# Patient Record
Sex: FEMALE | Race: WHITE | NOT HISPANIC OR LATINO | Employment: FULL TIME | URBAN - METROPOLITAN AREA
[De-identification: names, ages, dates, MRNs, and addresses within clinical notes are randomized per-mention and may not be internally consistent; named-entity substitution may affect disease eponyms.]

---

## 2017-01-11 ENCOUNTER — OFFICE VISIT - RIVER FALLS (OUTPATIENT)
Dept: FAMILY MEDICINE | Facility: CLINIC | Age: 37
End: 2017-01-11

## 2017-01-11 ENCOUNTER — COMMUNICATION - RIVER FALLS (OUTPATIENT)
Dept: FAMILY MEDICINE | Facility: CLINIC | Age: 37
End: 2017-01-11

## 2017-01-11 ASSESSMENT — MIFFLIN-ST. JEOR: SCORE: 1339.95

## 2017-01-14 LAB
CREAT SERPL-MCNC: 0.7 MG/DL (ref 0.5–1.1)
GLUCOSE BLD-MCNC: 91 MG/DL (ref 65–99)

## 2017-01-20 ENCOUNTER — OFFICE VISIT - HEALTHEAST (OUTPATIENT)
Dept: ALLERGY | Facility: CLINIC | Age: 37
End: 2017-01-20

## 2017-01-20 ENCOUNTER — COMMUNICATION - HEALTHEAST (OUTPATIENT)
Dept: TELEHEALTH | Facility: CLINIC | Age: 37
End: 2017-01-20

## 2017-01-20 DIAGNOSIS — L50.9 URTICARIA, UNSPECIFIED: ICD-10-CM

## 2017-08-30 ENCOUNTER — OFFICE VISIT - RIVER FALLS (OUTPATIENT)
Dept: FAMILY MEDICINE | Facility: CLINIC | Age: 37
End: 2017-08-30

## 2017-08-30 ASSESSMENT — MIFFLIN-ST. JEOR: SCORE: 1309.1

## 2017-09-20 ENCOUNTER — OFFICE VISIT - RIVER FALLS (OUTPATIENT)
Dept: FAMILY MEDICINE | Facility: CLINIC | Age: 37
End: 2017-09-20

## 2017-09-20 ASSESSMENT — MIFFLIN-ST. JEOR: SCORE: 1310.92

## 2021-05-13 ENCOUNTER — OFFICE VISIT (OUTPATIENT)
Dept: FAMILY MEDICINE | Facility: CLINIC | Age: 41
End: 2021-05-13
Payer: COMMERCIAL

## 2021-05-13 ENCOUNTER — ANCILLARY PROCEDURE (OUTPATIENT)
Dept: GENERAL RADIOLOGY | Facility: CLINIC | Age: 41
End: 2021-05-13
Attending: NURSE PRACTITIONER
Payer: COMMERCIAL

## 2021-05-13 VITALS
DIASTOLIC BLOOD PRESSURE: 88 MMHG | HEART RATE: 69 BPM | HEIGHT: 62 IN | OXYGEN SATURATION: 98 % | TEMPERATURE: 97.9 F | WEIGHT: 147.4 LBS | RESPIRATION RATE: 17 BRPM | SYSTOLIC BLOOD PRESSURE: 124 MMHG | BODY MASS INDEX: 27.12 KG/M2

## 2021-05-13 DIAGNOSIS — S69.91XA HAND INJURY, RIGHT, INITIAL ENCOUNTER: ICD-10-CM

## 2021-05-13 DIAGNOSIS — S69.91XA HAND INJURY, RIGHT, INITIAL ENCOUNTER: Primary | ICD-10-CM

## 2021-05-13 PROCEDURE — 99203 OFFICE O/P NEW LOW 30 MIN: CPT | Performed by: NURSE PRACTITIONER

## 2021-05-13 PROCEDURE — 73130 X-RAY EXAM OF HAND: CPT | Mod: RT | Performed by: RADIOLOGY

## 2021-05-13 ASSESSMENT — MIFFLIN-ST. JEOR: SCORE: 1296.85

## 2021-05-13 NOTE — PATIENT INSTRUCTIONS
1.  Continue to wear brace.  And use OTC pain medication.  2.  Make appointment with hand specialist in orthopedics for follow-up.

## 2021-05-13 NOTE — PROGRESS NOTES
Assessment & Plan     Hand injury, right, initial encounter  XR appears to show normal placement and no fracture.  There is some space in the metacarpal from wrist but other views show this in good alignment.  Most likely tendon/ligament injury causing symptoms.  Recommend continuing to use brace and follow-up with ortho hand specialist due to reduced movement.  - XR Hand Right G/E 3 Views; Future  - Orthopedic & Spine  Referral; Future    See Patient Instructions    Return for with ortho at your convenience.    Marie Parada NP  [facility]    Liana Salazar is a 41 year old who presents for the following health issues;     HPI     Musculoskeletal problem/pain  Onset/Duration: 4/26/2021  Description  Location: hand/thumb - right  Joint Swelling: YES  Redness: YES  Pain: YES- 8/10  Warmth: YES  Intensity:  moderate  Progression of Symptoms:  worsening and constant  Accompanying signs and symptoms:   Fevers: no  Numbness/tingling/weakness: YES- weakness  History  Trauma to the area: YES- was drinking alcohol and walking downstairs (like 3 Steps) and fell   Recent illness:  no  Previous similar problem: no  Previous evaluation:  YES- went to chiropractor and shoulder was popped back into place but she still cannot move her thumb on her right hand   Precipitating or alleviating factors:  Aggravating factors include: lifting, exercise and overuse  Therapies tried and outcome: heat, ice, support wrap and chiropractor seemed to have helped some     Patient states that when she fell that her thumb was pushed back about 1 inch and she pulled it back up into place.  She did have xray at chiropractic 1 week ago of the right hand and he felt that there was no fracture or dislocation at that time.  Patient is using brace, ice/heat which helps a little but still painful and she is only able to minimally move the thumb.    Review of Systems   CONSTITUTIONAL: NEGATIVE for fever, chills,  "change in weight  RESP: NEGATIVE for significant cough or SOB  CV: NEGATIVE for chest pain, palpitations or peripheral edema  MUSCULOSKELETAL: POSITIVE  for injury to right thumb/hand with swelling and pain after 2.5 weeks, some pain on lateral wrist also with no swelling  PSYCHIATRIC: NEGATIVE for changes in mood or affect  ROS otherwise negative      Objective    /88   Pulse 69   Temp 97.9  F (36.6  C) (Tympanic)   Resp 17   Ht 1.575 m (5' 2\")   Wt 66.9 kg (147 lb 6.4 oz)   SpO2 98%   BMI 26.96 kg/m    Body mass index is 26.96 kg/m .  Physical Exam   GENERAL: healthy, alert and no distress  MS: decreased range of motion that is significant, moderate edema to proximal metacarpal of the thumb on the right hand, peripheral pulses normal, tenderness to palpation over the swelling and with movement of thumb with PROM which goes into the wrist and arm, also some tenderness on lateral hand with no swelling and motion of wrist is normal and sensation of thumb and finger are intact with normal capillary refill  PSYCH: mentation appears normal, affect normal/bright    Xray - Reviewed and interpreted by me.  XR looks normal with no dislocation or fracture.  Awaiting radiology report.      "

## 2021-06-08 NOTE — PROGRESS NOTES
Chief complaint: Hives    History of present illness: This is a pleasant 36-year-old woman who is here today for evaluation of hives.  She states for the last 1-1/2-2 years she has developed chronic hives.  She presents pictures today that show large urticaria with some purple discoloration.  She has had angioedema of her face as well.  She was seen by an outside allergist almost 2 years ago.  I did access those records through care everywhere.  Allergy testing was done and she was positive to cats, pollen and dust.  She denies any UTI sneezing or congestion.  No cough, wheeze or shortness of breath.  She was prescribed cetirizine 10 mg twice daily and ranitidine 150 mg twice daily.  She reports that it did not seem to help much.  For this reason she has just been using antihistamines as needed which do not make much difference.  She is wanting to know if there is a source of her urticaria.  She was seen by her primary care physician recently and he performed blood work up which she does not have results.  She believes her thyroid was checked as well as her liver function.  She notes that exercise and alcohol seem to exacerbate the symptoms.  No change with illness or pressure.  She does not take nonsteroidal anti-inflammatory drugs.  No history of reflux or belly pain.    Past medical history: Otherwise unremarkable    Social history: She is a student, lives in an apartment, has cats, nonsmoker, former smoker    Family history: Sr. with angioedema of unknown etiology    Review of Systems performed as above and the remainder is negative.      No current outpatient prescriptions on file.    Allergies   Allergen Reactions     Morphine Hives       Visit Vitals     Pulse 69     Resp 16     SpO2 98%     Gen:  Pleasant female not in acute distress  HEENT:  Eyes no erythema of the bulbar or palpebral conjunctiva, no edema.  Mouth:  Throat clear, no lip or tongue edema.      Skin:  No rashes or lesions  Psych:  Alert and  oriented times 3    Impression report and plan:  1.  Chronic urticaria    I would like the patient to start 2 tabs twice daily of Allegra 180 mg.  If she is not better next week, add montelukast.  I did discuss with her Xolair as well as she appears to be antihistamine resistant.  I would like to obtain her records from her primary care physician to see what labs were drawn.  I would recommend an SAMMY given the appearance of her rash with the purple discoloration.  If symptoms improve, she should maintain on this regimen for 4-6 weeks before stopping.  The natural history of this disorder is a 85% of patients do resolve within 2 years.  Allergy testing is not indicated for chronic urticaria.  I did go over specific triggers which include alcohol, nonsteroidal anti-inflammatories, weather change, tight clothing.    Time spent with the patient 35 minutes, greater than half was spent counseling and coordination of care regarding urticaria.

## 2021-07-03 NOTE — ADDENDUM NOTE
Addendum Note by Tia Gibbs MD at 1/20/2017  1:49 PM     Author: Tia Gibbs MD Service: -- Author Type: Physician    Filed: 1/20/2017  1:49 PM Encounter Date: 1/20/2017 Status: Signed    : Tia Gibbs MD (Physician)    Addended by: TIA GIBBS on: 1/20/2017 01:49 PM        Modules accepted: Orders

## 2021-09-01 ENCOUNTER — OFFICE VISIT (OUTPATIENT)
Dept: FAMILY MEDICINE | Facility: CLINIC | Age: 41
End: 2021-09-01
Payer: COMMERCIAL

## 2021-09-01 VITALS
HEIGHT: 62 IN | TEMPERATURE: 98.5 F | OXYGEN SATURATION: 98 % | SYSTOLIC BLOOD PRESSURE: 102 MMHG | WEIGHT: 152.8 LBS | DIASTOLIC BLOOD PRESSURE: 80 MMHG | HEART RATE: 84 BPM | BODY MASS INDEX: 28.12 KG/M2

## 2021-09-01 DIAGNOSIS — Z00.00 ANNUAL PHYSICAL EXAM: Primary | ICD-10-CM

## 2021-09-01 PROCEDURE — 87624 HPV HI-RISK TYP POOLED RSLT: CPT | Performed by: NURSE PRACTITIONER

## 2021-09-01 PROCEDURE — G0145 SCR C/V CYTO,THINLAYER,RESCR: HCPCS | Performed by: NURSE PRACTITIONER

## 2021-09-01 PROCEDURE — 99395 PREV VISIT EST AGE 18-39: CPT | Performed by: NURSE PRACTITIONER

## 2021-09-01 ASSESSMENT — ENCOUNTER SYMPTOMS
HEMATOCHEZIA: 0
CONSTIPATION: 0
MYALGIAS: 0
WEAKNESS: 0
EYE PAIN: 0
CHILLS: 0
NAUSEA: 0
DIARRHEA: 0
DIZZINESS: 0
HEMATURIA: 0
SHORTNESS OF BREATH: 0
SORE THROAT: 0
ARTHRALGIAS: 0
BREAST MASS: 0
FEVER: 0
COUGH: 0
JOINT SWELLING: 0
PALPITATIONS: 0
NERVOUS/ANXIOUS: 0
FREQUENCY: 0
HEADACHES: 0
ABDOMINAL PAIN: 0
HEARTBURN: 0
DYSURIA: 0
PARESTHESIAS: 0

## 2021-09-01 ASSESSMENT — MIFFLIN-ST. JEOR: SCORE: 1321.35

## 2021-09-01 NOTE — PROGRESS NOTES
SUBJECTIVE:   CC: Marie Riley is an 41 year old woman who presents for preventive health visit.     Chief Complaint   Patient presents with     Physical     with pap        Patient has been advised of split billing requirements and indicates understanding: Yes  Healthy Habits:     Getting at least 3 servings of Calcium per day:  Yes    Bi-annual eye exam:  NO    Dental care twice a year:  Yes    Sleep apnea or symptoms of sleep apnea:  None    Diet:  Regular (no restrictions)    Frequency of exercise:  4-5 days/week    Duration of exercise:  30-45 minutes    Taking medications regularly:  Yes    Medication side effects:  None    PHQ-2 Total Score: 0    Additional concerns today:  No    Today's PHQ-2 Score:   PHQ-2 ( 1999 [employer]) 9/1/2021   Q1: Little interest or pleasure in doing things 0   Q2: Feeling down, depressed or hopeless 0   PHQ-2 Score 0   Q1: Little interest or pleasure in doing things Not at all   Q2: Feeling down, depressed or hopeless Not at all   PHQ-2 Score 0       Abuse: Current or Past (Physical, Sexual or Emotional) - No  Do you feel safe in your environment? Yes    Have you ever done Advance Care Planning? (For example, a Health Directive, POLST, or a discussion with a medical provider or your loved ones about your wishes): No, advance care planning information given to patient to review.  Patient declined advance care planning discussion at this time.    Social History     Tobacco Use     Smoking status: Former Smoker     Smokeless tobacco: Never Used   Substance Use Topics     Alcohol use: Yes     Comment: occassional      If you drink alcohol do you typically have >3 drinks per day or >7 drinks per week? No    Alcohol Use 9/1/2021   Prescreen: >3 drinks/day or >7 drinks/week? Yes   AUDIT SCORE  7       Reviewed orders with patient.  Reviewed health maintenance and updated orders accordingly - Yes  Lab work is in process  Labs reviewed in EPIC    Breast Cancer Screening:    Breast CA  Risk Assessment (FHS-7) 9/1/2021   Do you have a family history of breast, colon, or ovarian cancer? No / Unknown       click delete button to remove this line now  Mammogram Screening - under 40, not recommended   Pertinent mammograms are reviewed under the imaging tab.    History of abnormal Pap smear: YES - updated in Problem List and Health Maintenance accordingly     Reviewed and updated as needed this visit by clinical staff   Allergies  Meds              Reviewed and updated as needed this visit by Provider                    Review of Systems  CONSTITUTIONAL: NEGATIVE for fever, chills, change in weight  INTEGUMENTARU/SKIN: NEGATIVE for worrisome rashes, moles or lesions  EYES: NEGATIVE for vision changes or irritation  ENT: NEGATIVE for ear, mouth and throat problems  RESP: NEGATIVE for significant cough or SOB  BREAST: NEGATIVE for masses, tenderness or discharge  CV: NEGATIVE for chest pain, palpitations or peripheral edema  GI: NEGATIVE for nausea, abdominal pain, heartburn, or change in bowel habits  : NEGATIVE for unusual urinary or vaginal symptoms. Periods are regular.  MUSCULOSKELETAL: NEGATIVE for significant arthralgias or myalgia  NEURO: NEGATIVE for weakness, dizziness or paresthesias  PSYCHIATRIC: NEGATIVE for changes in mood or affect     OBJECTIVE:   There were no vitals taken for this visit.  Physical Exam  GENERAL: healthy, alert and no distress  EYES: Eyes grossly normal to inspection, PERRL and conjunctivae and sclerae normal  HENT: ear canals and TM's normal, nose and mouth without ulcers or lesions  NECK: no adenopathy, no asymmetry, masses, or scars and thyroid normal to palpation  RESP: lungs clear to auscultation - no rales, rhonchi or wheezes  CV: regular rate and rhythm, normal S1 S2, no S3 or S4, no murmur, click or rub, no peripheral edema and peripheral pulses strong   (female): normal female external genitalia, normal urethral meatus, vaginal mucosa, normal  "cervix/adnexa/uterus without masses or discharge  MS: no gross musculoskeletal defects noted, no edema  SKIN: no suspicious lesions or rashes  NEURO: Normal strength and tone, mentation intact and speech normal  PSYCH: mentation appears normal, affect normal/bright    Diagnostic Test Results:  Labs reviewed in Epic    ASSESSMENT/PLAN:   (Z00.00) Annual physical exam  (primary encounter diagnosis)  Comment:   Plan: Pap screen with HPV - recommended age 30 - 65         years              Patient has been advised of split billing requirements and indicates understanding: Yes  COUNSELING:  Reviewed preventive health counseling, as reflected in patient instructions       Regular exercise       Healthy diet/nutrition    Estimated body mass index is 26.96 kg/m  as calculated from the following:    Height as of 5/13/21: 1.575 m (5' 2\").    Weight as of 5/13/21: 66.9 kg (147 lb 6.4 oz).        She reports that she has quit smoking. She has never used smokeless tobacco.      Counseling Resources:  ATP IV Guidelines  Pooled Cohorts Equation Calculator  Breast Cancer Risk Calculator  BRCA-Related Cancer Risk Assessment: FHS-7 Tool  FRAX Risk Assessment  ICSI Preventive Guidelines  Dietary Guidelines for Americans, 2010  USDA's MyPlate  ASA Prophylaxis  Lung CA Screening    [facility]  "

## 2021-09-03 LAB
BKR LAB AP GYN ADEQUACY: NORMAL
BKR LAB AP GYN INTERPRETATION: NORMAL
BKR LAB AP HPV REFLEX: NORMAL
BKR LAB AP PREVIOUS ABNL DX: NORMAL
BKR LAB AP PREVIOUS ABNORMAL: NORMAL
PATH REPORT.COMMENTS IMP SPEC: NORMAL
PATH REPORT.RELEVANT HX SPEC: NORMAL

## 2021-09-08 LAB
HUMAN PAPILLOMA VIRUS 16 DNA: NEGATIVE
HUMAN PAPILLOMA VIRUS 18 DNA: NEGATIVE
HUMAN PAPILLOMA VIRUS FINAL DIAGNOSIS: NORMAL
HUMAN PAPILLOMA VIRUS OTHER HR: NEGATIVE

## 2021-09-13 ENCOUNTER — PATIENT OUTREACH (OUTPATIENT)
Dept: FAMILY MEDICINE | Facility: CLINIC | Age: 41
End: 2021-09-13

## 2022-02-11 VITALS
TEMPERATURE: 98 F | DIASTOLIC BLOOD PRESSURE: 80 MMHG | HEIGHT: 63 IN | WEIGHT: 153.4 LBS | BODY MASS INDEX: 27.18 KG/M2 | HEART RATE: 80 BPM | SYSTOLIC BLOOD PRESSURE: 130 MMHG

## 2022-02-12 VITALS
HEART RATE: 56 BPM | BODY MASS INDEX: 26.05 KG/M2 | WEIGHT: 147 LBS | TEMPERATURE: 97.6 F | HEIGHT: 63 IN | SYSTOLIC BLOOD PRESSURE: 108 MMHG | RESPIRATION RATE: 16 BRPM | DIASTOLIC BLOOD PRESSURE: 76 MMHG

## 2022-02-12 VITALS
WEIGHT: 146.6 LBS | BODY MASS INDEX: 25.98 KG/M2 | SYSTOLIC BLOOD PRESSURE: 126 MMHG | HEIGHT: 63 IN | TEMPERATURE: 97.5 F | HEART RATE: 82 BPM | DIASTOLIC BLOOD PRESSURE: 84 MMHG

## 2022-02-16 NOTE — PROGRESS NOTES
Chief Complaint    Patient presents with side pain, cloudy urine/bad smell/ burning with urination, and fever x 3 days.  History of Present Illness      Patient presents to clinic today with report of fever for the past 2 days and left flank pain for the past 2 days with 4 day history of foul-smelling urine.  No hematuria, no increased frequency, no urgency, no incontinence, she does report that she has to strain to void now.  She reports that she has had pyelonephritis to previous times.  The first was over 9 years ago and the second is 1-2 years ago.  She does not recall having had dysuria with either episode previously.  She does recall that with her episode prior to this she did have some malodorous urine.  She does report that after intercourse she does get up to void.  She wipes from front to back.  She tried to drink approximately a gallon of water per day but says that lately she has been moving so has not been able to do this.  She also thinks that this bladder infection or kidney infection may be related to intercourse, however she certainly had intercourse numerous times since her last kidney infection and has not developed symptoms.       Review of systems is as per HPI otherwise negative.       Exam general patient is alert and oriented ×3 in no acute distress.  HEENT pupils equally round and reactive to light, extraocular motion is intact mucous membranes are moist and pink and hearing is grossly normal.  Chest has bilateral rise with no increased work of breathing.  No wheezes rhonchi rales.  Cardiovascular normal perfusion and brisk capillary refill.  Back positive for left flank tenderness no rash no right flank tenderness.  Abdomen is soft positive bowel sounds.         Assessment and plan       Left acute pyelonephritis.  Discussed with patient that her cystitis symptoms were likely the foul-smelling urine.  Next time that she noticed with the changes she should come in for a urine analysis and  culture right away to help prevent this from a sending him to a pyelonephritis.  We will start the patient today on ciprofloxacin while her urine culture pending.  We did review the urinalysis result.  She will return to clinic if her symptoms worsen or do not improve and as needed for her next annual exam.  Physical Exam   Vitals & Measurements    T: 97.5(Tympanic)  HR: 82(Peripheral)  BP: 126/84     HT: 63 in  WT: 146.6 lb  BMI: 25.97   Assessment/Plan       Acute pyelonephritis         Ordered:          ciprofloxacin, 1 tab(s) ( 500 mg ), PO, q12hr, # 14 tab(s), 0 Refill(s), Type: Maintenance, Pharmacy: [id number], 1 tab(s) po q12 hrs,x7 day(s)          [id number] office outpatient visit 25 minutes (Charge), Quantity: 1, Acute pyelonephritis  Dysuria                Dysuria         Ordered:          ciprofloxacin, 1 tab(s) ( 500 mg ), PO, q12hr, # 14 tab(s), 0 Refill(s), Type: Maintenance, Pharmacy: [facility] #[id number], 1 tab(s) po q12 hrs,x7 day(s)          [id number] office outpatient visit 25 minutes (Charge), Quantity: 1, Acute pyelonephritis  Dysuria          Culture, Urine, Routine* ([facility]), Specimen Type: Urine (Clean Catch), Collection Date: 08/30/17 15:09:00 CDT          POC URINALYSIS, UA* ([facility]), Specimen Type: Urine, Collection Date: 08/30/17 15:09:00 CDT           Problem List/Past Medical History    Ongoing     Back pain     Depression, major, recurrent, moderate     Idiopathic Scoliosis     Left ankle sprain     Neck pain     Rib cage region somatic dysfunction     Scapular Dyskinesis     Somatic Dysfunction of Cervical Region     Somatic Dysfunction of Lumbar Region     Somatic Dysfunction of Sacral Region     Somatic Dysfunction of Thoracic Region     Tobacco Abuse, in Remission    Historical  Procedure/Surgical History     20552 injection single/mlt trigger point 1/2 muscles (Charge) (04/01/2011)     Vaginal Births (07/2009)     Insertion of intrauterine device (IUD)  (07/27/2004)  Medications    Cipro 500 mg oral tablet, 500 mg= 1 tab(s), po, q12 hrs    Mirena 52 mg intrauteral device, 52 mg= 1 EA, intrauteral, once  Allergies    morphine (Hives)  Social History    Smoking Status - 08/30/2017     Never smoker     Alcohol - Current, 04/05/2011      Current, 1-2 times per week     Exercise and Physical Activity - Regular exercise, 04/05/2011      Exercise type: Running, Weight lifting.     Home and Environment - 04/05/2011      Lives with Children.     Sexual - 04/05/2011      Sexually active: Yes. Other contraceptive use: IUD.     Tobacco - Past, 04/05/2011      Cigarettes, Stopped age 26 Years.  Family History    Rheumatoid arthritis: Aunt.  Immunizations      Vaccine Date Status      Hep B 02/14/2001 Recorded      Hep B 09/28/2000 Recorded      Hep B 08/24/2000 Recorded      Td 08/24/2000 Recorded      MMR (measles/mumps/rubella) 08/24/2000 Recorded      OPV 02/16/1988 Recorded      DTP (obsolete) 02/16/1988 Recorded      OPV 10/20/1987 Recorded      DTP (obsolete) 10/20/1987 Recorded      MMR (measles/mumps/rubella) 10/20/1987 Recorded  Lab Results      Results (Last 90 days)                Laboratory                     Urinalysis                          UA Dipstick                               UA Bilirubin:      NEGATIVE   (08/30/17 03:15 PM CDT)                                                                                                                                          UA Glucose:      NEGATIVE   (08/30/17 03:15 PM CDT)                                                                                                                                          UA Ketones:      TRACE   (08/30/17 03:15 PM CDT)                                                                                                                                          UA Leukocyte Esterase:      3+   (08/30/17 03:15 PM CDT)                                                                                                                                           UA Nitrite:      POSITIVE   (08/30/17 03:15 PM CDT)                                                                                                                                          UA Protein:      2+   (08/30/17 03:15 PM CDT)                                                                                                                                          UA Specific Gravity:      1.025   (08/30/17 03:15 PM CDT)                                                                                                                                          UA pH                                        (08/30/17 03:15 PM CDT)                                                                                                                                                5.5   (08/30/17 03:15 PM CDT)                                                                                                                                          Urine Occult Blood:      3+   (08/30/17 03:15 PM CDT)                                                                                                                                     UA Microscopic                               UA Bacteria:      Moderate   (08/30/17 03:20 PM CDT)                                                                                                                                          UA Epithelial Cells:      Few   (08/30/17 03:20 PM CDT)                                                                                                                                          UA RBC:      6-10   (08/30/17 03:20 PM CDT)                                                                                                                                          UA WBC:      >100   (08/30/17 03:20 PM CDT)

## 2022-02-16 NOTE — PROGRESS NOTES
Patient:   BLAIR SPRINGER            MRN: [id number]            FIN: [id number]               Age:   36 years     Sex:  Female     :  1980   Associated Diagnoses:   Acute maxillary sinusitis; Urticaria   Author:   Akira Vizacrra MD      Chief Complaint   2017 7:16 PM CST    c/o sinus pain, pressure and congestion for 2 mos.  Yellow nasal discharge.  Sense of smell is dimished. No teeth pain.  Intermittent hives x 1 year. Saw allergiest given Zyrtec and Ranitadine      History of Present Illness             The patient presents with sinus problem.  The sinus problem is located in the maxillary sinus.  The sinus problem is characterized by nasal congestion, rhinorrhea and facial pain.  The severity of the sinus problem is mild.  The sinus problem is constant and Symptoms did go away for a few days but then returned..  The sinus problem has lasted for 2 month(s).  The context of the sinus problem: occurred in association with illness.  Associated symptoms consist of cough, sore throat, had fever a fews days ago but only last 4 hours and denies ear pain.     Also wants to discuss intermittent hives - large, red welts.  States they have been occurring at random for the past year; however, does not have any today.  Occur on different parts of her body.  Did see allergist approx 1 year ago and had blood test.  Slight allergy to cats and severe allergy to rivera sultana.  Was started on Zyrtec and Zantac.  Took for approx 1 1/2 months but stopped due to no improvement.      Review of Systems   Ear/Nose/Mouth/Throat:  Decreased hearing, Nasal congestion, Sore throat, No ear pain, No jaw pain.         Nasal discharge: Moderate amount, Yellow.    Respiratory:  Cough, Sputum production, No wheezing.    Gastrointestinal:  No nausea, No vomiting.    Hematology/Lymphatics:  No bruising tendency, No bleeding tendency.    Immunologic:  No recurrent fevers, No recurrent infections.    Integumentary:  No rash, No pruritus.        Health Status   Allergies:    Allergic Reactions (Selected)  Severity Not Documented  Morphine (Hives)   Medications:  (Selected)   Documented Medications  Documented  Mirena 52 mg intrauteral device: 1 EA ( 52 mg ), intrauteral, once, 0 Refill(s), Type: Maintenance   Problem list:    All Problems  Back pain / 724.5 / Confirmed  Depression, major, recurrent, moderate / 810502515 / Confirmed  Idiopathic Scoliosis / 737.30 / Confirmed  Left ankle sprain / 845.00 / Confirmed  Neck pain / 723.1 / Confirmed  Rib cage region somatic dysfunction / 739.8 / Confirmed  Scapular Dyskinesis / 333.99 / Confirmed  Somatic Dysfunction of Cervical Region / 739.1 / Confirmed  Somatic Dysfunction of Lumbar Region / 739.3 / Confirmed  Somatic Dysfunction of Sacral Region / 739.4 / Confirmed  Somatic Dysfunction of Thoracic Region / 739.2 / Confirmed  Tobacco Abuse, in Remission / 305.1 / Confirmed      Histories   Social History:        Alcohol Assessment: Current            Current, 1-2 times per week      Tobacco Assessment: Past            Cigarettes, Stopped age 26 Years.      Home and Environment Assessment            Lives with Children.      Exercise and Physical Activity Assessment: Regular exercise            Exercise type: Running, Weight lifting.      Sexual Assessment            Sexually active: Yes.  Other contraceptive use: IUD.                     Comments:                      05/28/2010 - Vito SILVA, Cyrus PAREKH                     single        Physical Examination   Vital Signs   1/11/2017 7:16 PM CST Temperature Tympanic 98 DegF    Peripheral Pulse Rate 80 bpm    Systolic Blood Pressure 130 mmHg    Diastolic Blood Pressure 80 mmHg    Mean Arterial Pressure 97 mmHg      Measurements from flowsheet : Measurements   1/11/2017 7:16 PM CST Height Measured - Standard 63 in    Weight Measured - Standard 153.4 lb    BSA 1.76 m2    Body Mass Index 27.17 kg/m2      General:  Alert and oriented, No acute distress.    Eye:   Pupils are equal, round and reactive to light, Normal conjunctiva.    HENT:  Tympanic membranes are clear, Oral mucosa is moist.         Sinus: Maxillary sinus, Tenderness.    Neck:  Supple.    Respiratory:  Lungs are clear to auscultation, Respirations are non-labored.    Cardiovascular:  Normal rate, Regular rhythm, No edema.    Gastrointestinal:  Non-distended.    Musculoskeletal:  Normal gait.    Integumentary:  Warm, No rash.    Psychiatric:  Cooperative, Appropriate mood & affect, Normal judgment.       Impression and Plan   Diagnosis     Acute maxillary sinusitis (GMQ66-CD J01.00).     Urticaria (SLS68-TV L50.0).     Plan:  Exam and symptoms indicate a sinus infection.  Will treat infection with amoxicillin which is an antibiotic.  Finish entire course of antibiotic unless instructed otherwise.   Due to length of symptoms will also add a nasal spray for 1 month    Sinusitis responds well to moist air and irrigation -- consider trying saltwater nasal sprays or a NETI pot twice daily.  Showers and hot soups can help also.  Afrin nasal spray (over-the-counter medication) can provide symptom relief as it clears the nose.  After 3 days of use it must be discontinued for at least 2 weeks or complications can happen.  Sudafed can relieve pressure but might make you feel jittery or your heart race.  You should begin to improve after several days but it may take a couple of weeks to completely resolve your symptoms.  Return if you are not improving or if your symptoms become worse.     Hives also known as urticaria are an allergic response in your body.  Many possible causes so very hard to pin point.    Recommend keeping a journal as soon as they start to develop.  Discussed options for testing, blood tests vs. skin testing.  Will do allergy panel testing today as well as thyroid and complete blood count.  Recommend starting daily Allegra and refer to allergist..    Orders     Orders (Selected)   Outpatient  Orders  Ordered  Referral (Request): 01/11/17 19:39:00 CST, Referred to: Allergy & Immunology, Reason for referral: Recurrent allergic urticaria, Additional instructions: Saw allergist in [place] previously but looking for second opinion, Urticaria  Completed  Adult Food Allergy Profile # 46841: Adult Food Allergy Profile # 39872  Basic Metabolic Panel (Request): Urticaria  CBC w/o Diff (Request): Urticaria  Celiac Disease Ab Profile, w/ Reflex (Request): Urticaria  Hepatic Function Panel (Request): Urticaria  Respiratory Allergy Profile, Region [place], [place], [place] #[address]: Respiratory Allergy Profile, Region [place], [place], [place] #[address]  TSH (Request): Urticaria  Prescriptions  Prescribed  Flonase 50 mcg/inh nasal spray: 1 spray(s), nasal, daily, Instructions: in each nostril, # 1 EA, 0 Refill(s), Type: Maintenance, Pharmacy: [id number], 1 spray(s) nasal daily,Instr:in each nostril  amoxicillin 875 mg oral tablet: 1 tab(s) ( 875 mg ), PO, BID, # 28 tab(s), 0 Refill(s), Type: Maintenance, Pharmacy: [id number], 1 tab(s) po bid,x14 day(s)  fexofenadine 180 mg oral tablet: 1 tab(s) ( 180 mg ), PO, Daily, # 30 tab(s), 0 Refill(s), Type: Maintenance, Pharmacy: [id number], 1 tab(s) po daily.       Brianne KESSLER, acted solely as a scribe for and in the presence of Dr. Akira Vizcarra who performed the services.

## 2022-02-16 NOTE — PROGRESS NOTES
"   Patient:   BLAIR SPRINGER            MRN: [id number]            FIN: [id number]               Age:   37 years     Sex:  Female     :  1980   Associated Diagnoses:   Right wrist pain   Author:   Cyrus Padron PA-C      Chief Complaint   2017 3:25 PM CDT    Pt here for left thumb pain that happened 3 weeks ago after she fell off a :mechanical bull\"      History of Present Illness   Chief complaint and symptoms noted above and confirmed with patient   as above, has been wearing a thumb splint with some relief      Health Status   Allergies:    Allergic Reactions (Selected)  Severity Not Documented  Morphine (Hives)   Medications:  (Selected)   Documented Medications  Documented  Mirena 52 mg intrauteral device: 1 EA ( 52 mg ), intrauteral, once, 0 Refill(s), Type: Maintenance   Problem list:    All Problems  Depression, major, recurrent, moderate / SNOMED CT [id number] / Confirmed  Tobacco Abuse, in Remission / ICD-9-.1 / Confirmed  Scapular Dyskinesis / ICD-9-.99 / Confirmed  Neck pain / ICD-9-.1 / Confirmed  Back pain / ICD-9-.5 / Confirmed  Idiopathic Scoliosis / ICD-9-.30 / Confirmed  Somatic Dysfunction of Cervical Region / ICD-9-.1 / Confirmed  Somatic Dysfunction of Thoracic Region / ICD-9-.2 / Confirmed  Somatic Dysfunction of Lumbar Region / ICD-9-.3 / Confirmed  Somatic Dysfunction of Sacral Region / ICD-9-.4 / Confirmed  Rib cage region somatic dysfunction / ICD-9-.8 / Confirmed  Left ankle sprain / ICD-9-.00 / Confirmed      Histories   Family History:    Rheumatoid arthritis  Aunt     Procedure history:    Vaginal Births in the month of 2009 at 29 Years.  Comments:  2010 7:26 PM - Becky Rodriguez  2009; 2003  Insertion of intrauterine device (IUD) (V25.11) on 2004 at 24 Years.      Physical Examination   Vital Signs   2017 3:25 PM CDT Temperature Tympanic 97.6 DegF  LOW    Peripheral Pulse Rate 56 bpm  LOW    " Pulse Site Radial artery    Respiratory Rate 16 br/min    Systolic Blood Pressure 108 mmHg    Diastolic Blood Pressure 76 mmHg    Mean Arterial Pressure 87 mmHg    BP Site Right arm      Measurements from flowsheet : Measurements   9/20/2017 3:25 PM CDT Height Measured - Standard 63 in    Weight Measured - Standard 147 lb    BSA 1.72 m2    Body Mass Index 26.04 kg/m2      General:  No acute distress.    Musculoskeletal:  right hand:  good active ROM of hand and fingers, pain with palpation over right base of thumb,  good capillary refill and sensation in distal thumb, positive snuffbox tenderness, good wrist flexion and extension.       Review / Management   Radiology results   Results reviewed with patient.  Xray shows no obvious fractures or dislocations but perhaps an abnormality of the scaphoid.  Will be over-read by radiologist.  Will call if over-read has additional information.      Impression and Plan   Diagnosis     Right wrist pain (XYH91-LA M25.531).     Summary:  given  a wrist splint with thumb spica to wear for the next 2 weeks, (if radiologist does not see an abnormality), if pain continues will refer to ortho.  If radiologist sees an abnormality will have her see ortho sooner..    Orders     Orders   Requests (Radiology):  XR Wrist Complete w/ Navicular Right (Request) (Order): Instructions: pain over base of right thumb, positive snuffbox tenderness, Right wrist pain.     Orders   Charges (Evaluation and Management):  [id number] office outpatient visit 15 minutes (Charge) (Order): Quantity: 1, Right wrist pain  Charges:   whfo w/o joints pre ots (Charge) (Order): Quantity: 1, Right wrist pain.

## 2022-08-11 ENCOUNTER — PATIENT OUTREACH (OUTPATIENT)
Dept: FAMILY MEDICINE | Facility: CLINIC | Age: 42
End: 2022-08-11

## 2022-08-11 DIAGNOSIS — Z98.890 H/O LEEP: ICD-10-CM

## 2022-08-11 NOTE — LETTER
August 11, 2022      Marie Riley  [address] [facility] [address]        Dear ,    This letter is to remind you that you are due for your follow-up Pap smear and Human Papillomavirus (HPV) test.    Please call [phone] to schedule your appointment at your earliest convenience.    If you have completed the appointment outside of the [facility] system, please have the records forwarded to our office. We will update your chart for your provider to review before your next annual wellness visit.     Thank you for choosing [facility]!      Sincerely,    Your [facility] Care Team

## 2022-10-17 NOTE — TELEPHONE ENCOUNTER
FYI to provider - Patient is lost to pap tracking follow-up. Attempts to contact pt have been made per reminder process and there has been no reply and/or no appt scheduled.         Hx of abnormal pap- LEEP-? 2018?  9/1/21 NIL pap, neg HPV. Plan cotest in 1 year  8/11/22 Reminder letter  9/13/22 Reminder call -- left message  10/17/22 lost to follow up  Annabel Kang RN, BSN